# Patient Record
Sex: MALE | Employment: OTHER | URBAN - METROPOLITAN AREA
[De-identification: names, ages, dates, MRNs, and addresses within clinical notes are randomized per-mention and may not be internally consistent; named-entity substitution may affect disease eponyms.]

---

## 2018-09-27 ENCOUNTER — APPOINTMENT (OUTPATIENT)
Dept: ULTRASOUND IMAGING | Facility: CLINIC | Age: 43
End: 2018-09-27
Attending: NURSE PRACTITIONER
Payer: COMMERCIAL

## 2018-09-27 ENCOUNTER — HOSPITAL ENCOUNTER (EMERGENCY)
Facility: CLINIC | Age: 43
Discharge: HOME | End: 2018-09-27
Attending: NURSE PRACTITIONER | Admitting: NURSE PRACTITIONER
Payer: COMMERCIAL

## 2018-09-27 VITALS
SYSTOLIC BLOOD PRESSURE: 136 MMHG | BODY MASS INDEX: 26.66 KG/M2 | RESPIRATION RATE: 16 BRPM | TEMPERATURE: 98.6 F | DIASTOLIC BLOOD PRESSURE: 96 MMHG | HEIGHT: 65 IN | OXYGEN SATURATION: 96 % | WEIGHT: 160 LBS

## 2018-09-27 DIAGNOSIS — K40.90 UNILATERAL INGUINAL HERNIA WITHOUT OBSTRUCTION OR GANGRENE, RECURRENCE NOT SPECIFIED: ICD-10-CM

## 2018-09-27 LAB
ALBUMIN UR-MCNC: NEGATIVE MG/DL
APPEARANCE UR: CLEAR
BILIRUB UR QL STRIP: NEGATIVE
COLOR UR AUTO: ABNORMAL
GLUCOSE UR STRIP-MCNC: NEGATIVE MG/DL
HGB UR QL STRIP: NEGATIVE
KETONES UR STRIP-MCNC: NEGATIVE MG/DL
LEUKOCYTE ESTERASE UR QL STRIP: NEGATIVE
MUCOUS THREADS #/AREA URNS LPF: PRESENT /LPF
NITRATE UR QL: NEGATIVE
PH UR STRIP: 7 PH (ref 5–7)
RBC #/AREA URNS AUTO: 1 /HPF (ref 0–2)
SOURCE: ABNORMAL
SP GR UR STRIP: 1.01 (ref 1–1.03)
UROBILINOGEN UR STRIP-MCNC: NORMAL MG/DL (ref 0–2)
WBC #/AREA URNS AUTO: <1 /HPF (ref 0–5)

## 2018-09-27 PROCEDURE — 81001 URINALYSIS AUTO W/SCOPE: CPT | Performed by: NURSE PRACTITIONER

## 2018-09-27 PROCEDURE — 25000132 ZZH RX MED GY IP 250 OP 250 PS 637: Performed by: NURSE PRACTITIONER

## 2018-09-27 PROCEDURE — 99284 EMERGENCY DEPT VISIT MOD MDM: CPT | Mod: 25

## 2018-09-27 PROCEDURE — 76870 US EXAM SCROTUM: CPT

## 2018-09-27 RX ORDER — ACETAMINOPHEN 500 MG
1000 TABLET ORAL ONCE
Status: COMPLETED | OUTPATIENT
Start: 2018-09-27 | End: 2018-09-27

## 2018-09-27 RX ADMIN — ACETAMINOPHEN 1000 MG: 500 TABLET ORAL at 11:12

## 2018-09-27 ASSESSMENT — ENCOUNTER SYMPTOMS
CHEST TIGHTNESS: 0
ANAL BLEEDING: 0
CONSTIPATION: 0
APPETITE CHANGE: 0
BACK PAIN: 0
NAUSEA: 0
DIARRHEA: 0
RECTAL PAIN: 0
MYALGIAS: 0
DYSURIA: 0
CHILLS: 0
SHORTNESS OF BREATH: 0
ABDOMINAL PAIN: 0
ABDOMINAL DISTENTION: 0
FEVER: 0
BLOOD IN STOOL: 0
FATIGUE: 0
HEMATURIA: 0
HEADACHES: 0
COUGH: 0
VOMITING: 0

## 2018-09-27 NOTE — ED AVS SNAPSHOT
Emergency Department    [address] [place] [address]    Phone:  [phone]    Fax:  [phone]                                       Malcolm Powell   MRN: [id number]    Department:   Emergency Department   Date of Visit:  9/27/2018           After Visit Summary Signature Page     I have received my discharge instructions, and my questions have been answered. I have discussed any challenges I see with this plan with the nurse or doctor.    ..........................................................................................................................................  Patient/Patient Representative Signature      ..........................................................................................................................................  Patient Representative Print Name and Relationship to Patient    ..................................................               ................................................  Date                                   Time    ..........................................................................................................................................  Reviewed by Signature/Title    ...................................................              ..............................................  Date                                               Time          22EPIC Rev 08/18

## 2018-09-27 NOTE — DISCHARGE INSTRUCTIONS
Return to the ED with return of the swelling and pain at the hernia site.    What Is a Hernia?    A hernia is when an organ or tissue pushes through a weak area in the belly (abdominal) wall. This weak area may be present at birth. Or it may be caused by abdominal strain over time. If not treated, a hernia can get worse with time and physical stress.  When a bulge forms  When there is a weak area in the abdominal wall, an organ or tissue can push outward. This often causes a bulge that you can see under your skin. The bulge may get bigger when you stand up. It may go away when you lie down. You may also feel some pressure or mild pain when lifting, coughing, urinating, or doing other activities.  Types of hernias  The type of hernia you have depends on its location. Most hernias form in the groin at or near the internal ring. This is the entrance to a canal between the abdomen and groin. Hernias can also occur in the abdomen, thigh, or genitals.    An incisional hernia occurs at the site of a previous surgical incision.    An umbilical hernia occurs at the navel.    An indirect inguinal hernia occurs in the groin at the internal ring.    A direct inguinal hernia occurs in the groin near the internal ring.    A femoral hernia occurs just below the groin.    An epigastric hernia occurs in the upper abdomen at the midline.  Diagnosis  In most cases, your healthcare provider can diagnose a hernia by doing a physical exam.  In some cases it might not be clear why you have a swelling in the belly wall. Then your provider may order an imaging test such as an ultrasound. This can help with the diagnosis.  Surgery  A hernia will not heal on its own. Surgery is needed to fix the weak spot in the abdominal wall. If not treated, a hernia can get larger. It can also cause serious health problems. The good news is that hernia surgery can be done quickly and safely. In some cases, you can go home the same day as your  surgery.   When to call your provider  Call your healthcare provider right away if the swelling around your hernia becomes larger, firmer, or more painful. These may be signs that your intestines are stuck in the abdominal wall. This is an emergency. The hernia must be repaired right away to avoid serious problems.  Date Last Reviewed: 7/1/2016 2000-2017 [employer]. [address] [place], [place] [id number]. All rights reserved. This information is not intended as a substitute for professional medical care. Always follow your healthcare professional's instructions.

## 2018-09-27 NOTE — ED AVS SNAPSHOT
Emergency Department    [address] [facility] [address]    Phone:  [phone]    Fax:  [phone]                                       Malcolm Powell   MRN: [id number]    Department:   Emergency Department   Date of Visit:  9/27/2018           Patient Information     Date Of Birth          1975        Your diagnoses for this visit were:     Unilateral inguinal hernia without obstruction or gangrene, recurrence not specified reduced       You were seen by Pamela Wing, CNP.      Follow-up Information     Follow up with [facility] In 1 day.    Why:  to discuss hernia repair    Contact information:    [address]  [facility] [address] [phone]        Follow up with  Emergency Department.    Specialty:  EMERGENCY MEDICINE    Why:  As needed, If symptoms worsen    Contact information:    [address] [facility] [address] [phone]        Discharge Instructions         Return to the ED with return of the swelling and pain at the hernia site.    What Is a Hernia?    A hernia is when an organ or tissue pushes through a weak area in the belly (abdominal) wall. This weak area may be present at birth. Or it may be caused by abdominal strain over time. If not treated, a hernia can get worse with time and physical stress.  When a bulge forms  When there is a weak area in the abdominal wall, an organ or tissue can push outward. This often causes a bulge that you can see under your skin. The bulge may get bigger when you stand up. It may go away when you lie down. You may also feel some pressure or mild pain when lifting, coughing, urinating, or doing other activities.  Types of hernias  The type of hernia you have depends on its location. Most hernias form in the groin at or near the internal ring. This is the entrance to a canal between the abdomen and groin. Hernias can also occur in the abdomen, thigh, or  genitals.    An incisional hernia occurs at the site of a previous surgical incision.    An umbilical hernia occurs at the navel.    An indirect inguinal hernia occurs in the groin at the internal ring.    A direct inguinal hernia occurs in the groin near the internal ring.    A femoral hernia occurs just below the groin.    An epigastric hernia occurs in the upper abdomen at the midline.  Diagnosis  In most cases, your healthcare provider can diagnose a hernia by doing a physical exam.  In some cases it might not be clear why you have a swelling in the belly wall. Then your provider may order an imaging test such as an ultrasound. This can help with the diagnosis.  Surgery  A hernia will not heal on its own. Surgery is needed to fix the weak spot in the abdominal wall. If not treated, a hernia can get larger. It can also cause serious health problems. The good news is that hernia surgery can be done quickly and safely. In some cases, you can go home the same day as your surgery.   When to call your provider  Call your healthcare provider right away if the swelling around your hernia becomes larger, firmer, or more painful. These may be signs that your intestines are stuck in the abdominal wall. This is an emergency. The hernia must be repaired right away to avoid serious problems.  Date Last Reviewed: 7/1/2016 2000-2017 [employer]. [address] [place] [address], [place] [address]. All rights reserved. This information is not intended as a substitute for professional medical care. Always follow your healthcare professional's instructions.          24 Hour Appointment Hotline       To make an appointment at any [facility], call [phone] ([phone]). If you don't have a family doctor or clinic, we will help you find one. [facility] are conveniently located to serve the needs of you and your family.             Review of your medicines      Notice     You have not been prescribed any  medications.            Procedures and tests performed during your visit     UA with Microscopic reflex to Culture    US Testicular & Scrotum w Doppler Ltd      Orders Needing Specimen Collection     None      Pending Results     Date and Time Order Name Status Description    9/27/2018 1108 US Testicular & Scrotum w Doppler Ltd Preliminary             Pending Culture Results     No orders found from 9/25/2018 to 9/28/2018.            Pending Results Instructions     If you had any lab results that were not finalized at the time of your Discharge, you can call the ED Lab Result RN at [phone]. You will be contacted by this team for any positive Lab results or changes in treatment. The nurses are available 7 days a week from 10A to 6:30P.  You can leave a message 24 hours per day and they will return your call.        Test Results From Your Hospital Stay        9/27/2018  1:07 PM      Narrative     ULTRASOUND TESTICULAR AND SCROTUM WITH DOPPLER LIMITED September 27, 2018 12:40 PM     HISTORY: Left testicular pain, question hernia.    COMPARISON: None.    FINDINGS: Doppler waveform analysis shows blood flow within both  testicles. Both testicles appear unremarkable.          Impression     IMPRESSION:  1. Small bilateral hydrocele.  2. Left inguinal hernia.         9/27/2018 11:22 AM      Component Results     Component Value Ref Range & Units Status    Color Urine Light Yellow  Final    Appearance Urine Clear  Final    Glucose Urine Negative NEG^Negative mg/dL Final    Bilirubin Urine Negative NEG^Negative Final    Ketones Urine Negative NEG^Negative mg/dL Final    Specific Gravity Urine 1.009 1.003 - 1.035 Final    Blood Urine Negative NEG^Negative Final    pH Urine 7.0 5.0 - 7.0 pH Final    Protein Albumin Urine Negative NEG^Negative mg/dL Final    Urobilinogen mg/dL Normal 0.0 - 2.0 mg/dL Final    Nitrite Urine Negative NEG^Negative Final    Leukocyte Esterase Urine Negative NEG^Negative Final    Source  Midstream Urine  Final    WBC Urine <1 0 - 5 /HPF Final    RBC Urine 1 0 - 2 /HPF Final    Mucous Urine Present (A) NEG^Negative /LPF Final                Clinical Quality Measure: Blood Pressure Screening     Your blood pressure was checked while you were in the emergency department today. The last reading we obtained was  BP: (!) 146/123 . Please read the guidelines below about what these numbers mean and what you should do about them.  If your systolic blood pressure (the top number) is less than 120 and your diastolic blood pressure (the bottom number) is less than 80, then your blood pressure is normal. There is nothing more that you need to do about it.  If your systolic blood pressure (the top number) is 120-139 or your diastolic blood pressure (the bottom number) is 80-89, your blood pressure may be higher than it should be. You should have your blood pressure rechecked within a year by a primary care provider.  If your systolic blood pressure (the top number) is 140 or greater or your diastolic blood pressure (the bottom number) is 90 or greater, you may have high blood pressure. High blood pressure is treatable, but if left untreated over time it can put you at risk for heart attack, stroke, or kidney failure. You should have your blood pressure rechecked by a primary care provider within the next 4 weeks.  If your provider in the emergency department today gave you specific instructions to follow-up with your doctor or provider even sooner than that, you should follow that instruction and not wait for up to 4 weeks for your follow-up visit.        Thank you for choosing [facility]       Thank you for choosing [facility] for your care. Our goal is always to provide you with excellent care. Hearing back from our patients is one way we can continue to improve our services. Please take a few minutes to complete the written survey that you may receive in the mail after you visit with us. Thank you!        [employer]  "Information     [employer] lets you send messages to your doctor, view your test results, renew your prescriptions, schedule appointments and more. To sign up, go to [email or web address] . Click on \"Log in\" on the left side of the screen, which will take you to the Welcome page. Then click on \"Sign up Now\" on the right side of the page.     You will be asked to enter the access code listed below, as well as some personal information. Please follow the directions to create your username and password.     Your access code is: [email or web address]  Expires: 2018 11:06 AM     Your access code will  in 90 days. If you need help or a new code, please call your [facility] clinic or [phone].        Care EveryWhere ID     This is your Care EveryWhere ID. This could be used by other organizations to access your [facility] medical records  [id number]        Equal Access to Services     ELICOE MEADOWS AH: Hadii leta ruckero Sopamela, waaxda luqadaha, qaybta kaalmada adeleona, juan j reis . So [facility] [phone].    ATENCIÓN: Si habla español, tiene a blount disposición servicios gratuitos de asistencia lingüística. Llame al [phone].    We comply with applicable federal civil rights laws and [place] laws. We do not discriminate on the basis of race, color, national origin, age, disability, sex, sexual orientation, or gender identity.            After Visit Summary       This is your record. Keep this with you and show to your community pharmacist(s) and doctor(s) at your next visit.                  "

## 2018-09-27 NOTE — ED PROVIDER NOTES
History     Chief Complaint:  Abdominal Pain     HPI   Malcolm Powell is a 42 year old male who presents by himself for evaluation of left sided low pelvic pain.  The patient notes onset of intermittent similar symptoms approximately 2 years ago which wax and wane.  He notes symptoms usually resolve after a few hours but the pain today has remained since yesterday.  The pain is located to the left pelvic and scrotal area and radiate to the left lower abdomen. His pain is not worsened with activity, lifting, urinating, or having a bowel movement and nothing currently relieves his pain. He has taken no medications for his symptoms.  He has not been evaluated for these symptoms.  He denies associated nausea, diarrhea, constipation, dysuria, penile discharge, fevers, back pain.     Allergies:  No Known Allergies     Medications:    The patient is not currently taking any prescribed medications.    Past Medical History:    History reviewed. No pertinent past medical history.    Past Surgical History:    History reviewed. No pertinent surgical history.    Family History:    History reviewed. No pertinent family history.     Social History:  Smoking status: Never smoker  Alcohol use: No    Review of Systems   Constitutional: Negative for appetite change, chills, fatigue and fever.   Respiratory: Negative for cough, chest tightness and shortness of breath.    Cardiovascular: Negative for chest pain and leg swelling.   Gastrointestinal: Negative for abdominal distention, abdominal pain, anal bleeding, blood in stool, constipation, diarrhea, nausea, rectal pain and vomiting.   Genitourinary: Positive for testicular pain. Negative for discharge, dysuria, genital sores, hematuria, penile pain, penile swelling and scrotal swelling.   Musculoskeletal: Negative for back pain and myalgias.   Skin: Negative for rash.   Neurological: Negative for headaches.     Physical Exam   Patient Vitals for the past 24 hrs:   BP Temp Temp src  "Heart Rate Resp SpO2 Height Weight   09/27/18 1416 (!) 136/96 - - 83 - 96 % - -   09/27/18 1400 (!) 146/123 - - - - - - -   09/27/18 1244 (!) 128/100 - - - - - - -   09/27/18 1139 117/73 - - - - - - -   09/27/18 1100 (!) 129/110 - - - - 96 % - -   09/27/18 1049 (!) 145/102 98.6  F (37  C) Oral 78 16 98 % 1.651 m (5' 5\") 72.6 kg (160 lb)     Physical Exam   Nursing notes reviewed. Vitals reviewed.  General: Alert. Well kept.  Eyes:  Conjunctiva non-injected, non-icteric.  Neck/Throat: Moist mucous membranes. Normal voice.  Cardiac: Regular rhythm. Normal heart sounds.  Pulmonary: Clear and equal breath sounds bilaterally.   Abdomen: soft and non-tender with no LLQ or suprapubic tenderness.  : Exam performed in presence of nurse. Circumcized, no penile discharge or rash/sores. Bilateral descended testicles, no masses, no swelling, no erythema, no rashes. No tenderness to palpation along bilateral epididymus. No Jeffers Clapper deformity bilateral. No inguinal hernias on standing examination, but tenderness and swelling superior to the inguinal ligament over the left lateral groin.   Musculoskeletal: Normal gross range of motion of all 4 extremities.   Neurological: Alert and oriented x4.   Skin: Warm and dry. Normal appearance of visualized exposed skin without rashes or petechiae.  Psych: Affect normal. Good eye contact.    Emergency Department Course   Imaging:  Radiographic findings were communicated with the patient who voiced understanding of the findings.    US Testicular and Scrotum with doppler ltd  1. Small bilateral hydrocele.  2. Left inguinal hernia.  As read by Radiology.    Laboratory:  UA: Mucous present, o/w negative    Interventions:  1112: Tylenol 1,000 mg oral    Emergency Department Course:  Past medical records, nursing notes, and vitals reviewed.  1104: I performed an exam of the patient and obtained history, as documented above.  UA sent, results above.   The patient was sent for a Testicular US " while in the emergency department, findings above.    1404: On repeat examination, the patient's hernia has reduced and he is having no pain.     I rechecked the patient.  Findings and plan explained to the Patient. Patient discharged home with instructions regarding supportive care, medications, and reasons to return. The importance of close follow-up was reviewed.     Impression & Plan      Medical Decision Making:  Malcolm Powell is a 42 year old male who presents for evaluation of pelvic pain. On examination, the patient has a palpable mass along the left side of the pelvis, superior to the inguinal canal. His scrotal exam is without abnormalities. Urinalysis returns without signs of infectious process and ultrasound of the scrotum shows a small bilateral hydrocele and a left inguinal hernia. On re-examination, the patient notes complete resolution of his pain and the hernia has reduced while in ultrasound. He currently has no pain. He is ambulatory without any difficulty or discomfort, and he has had resolution of his symptoms while in the Emergency Department. He is appropriate for outpatient follow up and was given referral for surgery. He was also instructed to not do any heavy lifting or exercise until seen for follow up, and if his symptoms return, he is to present emergently to the ED.   Of note, I did discuss recheck with his primary provider for recheck of his blood pressure which was elevated in the ED.  He denies headache, chest pain, shortness of breath or history of high blood pressure.    Diagnosis:    ICD-10-CM    1. Unilateral inguinal hernia without obstruction or gangrene, recurrence not specified K40.90     reduced     Disposition: Discharged to home    Chioma Bhardwaj  9/27/2018    EMERGENCY DEPARTMENT    I, Chioma Bhardwaj, am serving as a scribe at 11:04 AM on 9/27/2018 to document services personally performed by Pamela Wing CNP based on my observations and the provider's statements to  me.          Pamela Wing, CNP  09/27/18 1756       Pamela Wing CNP  09/27/18 1759